# Patient Record
(demographics unavailable — no encounter records)

---

## 2024-10-22 NOTE — HISTORY OF PRESENT ILLNESS
[Home] : at home, [unfilled] , at the time of the visit. [Other Location: e.g. Home (Enter Location, City,State)___] : at [unfilled] [Verbal consent obtained from patient] : the patient, [unfilled] [FreeTextEntry8] : 34 y/o female with a h/o IBS. She has had severe abdominal pain 10/10 for 2 days. It is intermittent. It doesnt go down to a zero. Right now it s a2/10.  Normal bowel movements. No blood in her stool. No fever/chills. No diarrhea. Sometimes has the urge to have a BM but can't go. She is passing gas. No nausea/vomiting tender near her belly button. Feels distended. Urine is fine.

## 2024-10-22 NOTE — PLAN
[FreeTextEntry1] : Abdominal pain Advised patient that she should seek in person evaluation since he has severe abdominal pain. She may need imaging but an examination is necessary to determine Continue Miralax bid Gas X for bloating She requests antibiotics but I felt that she needs to She asked about antibiotics but I believe she should be seen in person. She expressed understanding

## 2024-10-22 NOTE — HISTORY OF PRESENT ILLNESS
[Home] : at home, [unfilled] , at the time of the visit. [Other Location: e.g. Home (Enter Location, City,State)___] : at [unfilled] [Verbal consent obtained from patient] : the patient, [unfilled] [FreeTextEntry8] : 32 y/o female with a h/o IBS. She has had severe abdominal pain 10/10 for 2 days. It is intermittent. It doesnt go down to a zero. Right now it s a2/10.  Normal bowel movements. No blood in her stool. No fever/chills. No diarrhea. Sometimes has the urge to have a BM but can't go. She is passing gas. No nausea/vomiting tender near her belly button. Feels distended. Urine is fine.